# Patient Record
Sex: FEMALE | Race: WHITE | NOT HISPANIC OR LATINO | Employment: FULL TIME | RURAL
[De-identification: names, ages, dates, MRNs, and addresses within clinical notes are randomized per-mention and may not be internally consistent; named-entity substitution may affect disease eponyms.]

---

## 2020-08-03 ENCOUNTER — HISTORICAL (OUTPATIENT)
Dept: ADMINISTRATIVE | Facility: HOSPITAL | Age: 45
End: 2020-08-03

## 2020-08-03 LAB
HBV SURFACE AB SER-ACNC: NORMAL M[IU]/ML
HBV SURFACE AG SERPL QL IA: NORMAL

## 2020-08-05 LAB
VARICELLA ZOSTER, BLOOD: POSITIVE
VZV IGG INDEX: 4.36 (ref 0–0.9)

## 2021-06-01 ENCOUNTER — CLINICAL SUPPORT (OUTPATIENT)
Dept: PRIMARY CARE CLINIC | Facility: CLINIC | Age: 46
End: 2021-06-01

## 2021-06-01 DIAGNOSIS — Z56.9 EMPLOYMENT PROBLEM: ICD-10-CM

## 2021-06-01 DIAGNOSIS — Z00.00 PHYSICAL EXAM: ICD-10-CM

## 2021-06-01 DIAGNOSIS — Z11.1 SCREENING-PULMONARY TB: Primary | ICD-10-CM

## 2021-06-01 LAB
HBV SURFACE AB SER-ACNC: NORMAL M[IU]/ML
RUBV IGG SER-ACNC: 122.4 IU/ML

## 2021-06-01 PROCEDURE — 86765 RUBEOLA ANTIBODY IGG: ICD-10-PCS | Mod: ,,, | Performed by: CLINICAL MEDICAL LABORATORY

## 2021-06-01 PROCEDURE — 86706 HEPATITIS B SURFACE ANTIBODY: ICD-10-PCS | Mod: ,,, | Performed by: CLINICAL MEDICAL LABORATORY

## 2021-06-01 PROCEDURE — 36415 COLL VENOUS BLD VENIPUNCTURE: CPT | Mod: ,,, | Performed by: NURSE PRACTITIONER

## 2021-06-01 PROCEDURE — 86803 HEPATITIS C ANTIBODY: ICD-10-PCS | Mod: ,,, | Performed by: CLINICAL MEDICAL LABORATORY

## 2021-06-01 PROCEDURE — 86762 RUBELLA ANTIBODY, IGG: ICD-10-PCS | Mod: ,,, | Performed by: CLINICAL MEDICAL LABORATORY

## 2021-06-01 PROCEDURE — 86735 MUMPS ANTIBODY: CPT | Mod: ,,, | Performed by: CLINICAL MEDICAL LABORATORY

## 2021-06-01 PROCEDURE — 86735 MUMPS, IGG SCREEN: ICD-10-PCS | Mod: ,,, | Performed by: CLINICAL MEDICAL LABORATORY

## 2021-06-01 PROCEDURE — 86803 HEPATITIS C AB TEST: CPT | Mod: ,,, | Performed by: CLINICAL MEDICAL LABORATORY

## 2021-06-01 PROCEDURE — 86706 HEP B SURFACE ANTIBODY: CPT | Mod: ,,, | Performed by: CLINICAL MEDICAL LABORATORY

## 2021-06-01 PROCEDURE — 86765 RUBEOLA ANTIBODY: CPT | Mod: ,,, | Performed by: CLINICAL MEDICAL LABORATORY

## 2021-06-01 PROCEDURE — 86762 RUBELLA ANTIBODY: CPT | Mod: ,,, | Performed by: CLINICAL MEDICAL LABORATORY

## 2021-06-01 PROCEDURE — 36415 PR COLLECTION VENOUS BLOOD,VENIPUNCTURE: ICD-10-PCS | Mod: ,,, | Performed by: NURSE PRACTITIONER

## 2021-06-01 PROCEDURE — 86787 VARICELLA-ZOSTER ANTIBODY: CPT | Mod: ,,, | Performed by: CLINICAL MEDICAL LABORATORY

## 2021-06-01 PROCEDURE — 86787 VARICELLA ZOSTER ANTIBODY, IGG: ICD-10-PCS | Mod: ,,, | Performed by: CLINICAL MEDICAL LABORATORY

## 2021-06-01 SDOH — SOCIAL DETERMINANTS OF HEALTH (SDOH): UNSPECIFIED PROBLEMS RELATED TO EMPLOYMENT: Z56.9

## 2021-06-02 LAB
HCV AB SER QL: NORMAL
MEASLES IGG INDEX: 3.7
MEV IGG SER QL: POSITIVE
MUMPS AB IGG INDEX: 1.2
MUV IGG SER QL IA: POSITIVE
VARICELLA ZOSTER, BLOOD: POSITIVE
VZV IGG INDEX: >4.2 (ref 0–0.9)

## 2021-06-03 LAB
TB INDURATION - 48 HR READ: 0 MM
TB INDURATION - 72 HR READ: NORMAL
TB SKIN TEST - 48 HR READ: NEGATIVE
TB SKIN TEST - 72 HR READ: NORMAL

## 2022-02-02 ENCOUNTER — LAB VISIT (OUTPATIENT)
Dept: PRIMARY CARE CLINIC | Facility: CLINIC | Age: 47
End: 2022-02-02

## 2022-02-02 DIAGNOSIS — Z20.822 COVID-19 RULED OUT: Primary | ICD-10-CM

## 2022-02-02 LAB
FLUAV AG UPPER RESP QL IA.RAPID: NEGATIVE
FLUBV AG UPPER RESP QL IA.RAPID: NEGATIVE
SARS-COV+SARS-COV-2 AG RESP QL IA.RAPID: POSITIVE

## 2022-02-02 PROCEDURE — 87428 SARSCOV & INF VIR A&B AG IA: CPT | Performed by: NURSE PRACTITIONER

## 2022-02-02 NOTE — PROGRESS NOTES
Subjective:       Patient ID: Genesis Medeiros is a 46 y.o. female.    Chief Complaint: No chief complaint on file.    HPI  Review of Systems      Objective:      Physical Exam    Assessment:       Problem List Items Addressed This Visit    None     Visit Diagnoses     COVID-19 ruled out    -  Primary    Relevant Orders    SARS-CoV2 (COVID) with FLU Antigen          Plan:       Covid and flu only

## 2022-06-24 ENCOUNTER — OFFICE VISIT (OUTPATIENT)
Dept: PRIMARY CARE CLINIC | Facility: CLINIC | Age: 47
End: 2022-06-24

## 2022-06-24 DIAGNOSIS — Z20.822 COVID-19 RULED OUT: Primary | ICD-10-CM

## 2022-06-24 PROCEDURE — 87428 SARSCOV & INF VIR A&B AG IA: CPT | Performed by: NURSE PRACTITIONER

## 2022-10-21 ENCOUNTER — CLINICAL SUPPORT (OUTPATIENT)
Dept: PRIMARY CARE CLINIC | Facility: CLINIC | Age: 47
End: 2022-10-21

## 2022-10-21 DIAGNOSIS — Z02.89 ENCOUNTER FOR PHYSICAL EXAMINATION RELATED TO EMPLOYMENT: Primary | ICD-10-CM

## 2022-10-21 LAB — RUBV IGG SER-ACNC: 134.7 IU/ML

## 2022-10-21 PROCEDURE — 36415 PR COLLECTION VENOUS BLOOD,VENIPUNCTURE: ICD-10-PCS | Mod: ,,, | Performed by: NURSE PRACTITIONER

## 2022-10-21 PROCEDURE — 99499 PR PHYSICAL - BASIC NON DOT/CDL: ICD-10-PCS | Mod: ,,, | Performed by: NURSE PRACTITIONER

## 2022-10-21 PROCEDURE — 90686 FLU VACCINE (QUAD) GREATER THAN OR EQUAL TO 3YO PRESERVATIVE FREE IM: ICD-10-PCS | Mod: ,,, | Performed by: NURSE PRACTITIONER

## 2022-10-21 PROCEDURE — 99000 PR URINE DRUG SCREEN COLLECTION: ICD-10-PCS | Mod: ,,, | Performed by: NURSE PRACTITIONER

## 2022-10-21 PROCEDURE — 90471 IMMUNIZATION ADMIN: CPT | Mod: ,,, | Performed by: NURSE PRACTITIONER

## 2022-10-21 PROCEDURE — 90471 FLU VACCINE (QUAD) GREATER THAN OR EQUAL TO 3YO PRESERVATIVE FREE IM: ICD-10-PCS | Mod: ,,, | Performed by: NURSE PRACTITIONER

## 2022-10-21 PROCEDURE — 90715 TDAP VACCINE GREATER THAN OR EQUAL TO 7YO IM: ICD-10-PCS | Mod: ,,, | Performed by: NURSE PRACTITIONER

## 2022-10-21 PROCEDURE — 36415 COLL VENOUS BLD VENIPUNCTURE: CPT | Mod: ,,, | Performed by: NURSE PRACTITIONER

## 2022-10-21 PROCEDURE — 99499 UNLISTED E&M SERVICE: CPT | Mod: ,,, | Performed by: NURSE PRACTITIONER

## 2022-10-21 PROCEDURE — 90472 PR IMMUNIZ,ADMIN,EACH ADDL: ICD-10-PCS | Mod: ,,, | Performed by: NURSE PRACTITIONER

## 2022-10-21 PROCEDURE — 90686 IIV4 VACC NO PRSV 0.5 ML IM: CPT | Mod: ,,, | Performed by: NURSE PRACTITIONER

## 2022-10-21 PROCEDURE — 90472 IMMUNIZATION ADMIN EACH ADD: CPT | Mod: ,,, | Performed by: NURSE PRACTITIONER

## 2022-10-21 PROCEDURE — 99000 SPECIMEN HANDLING OFFICE-LAB: CPT | Mod: ,,, | Performed by: NURSE PRACTITIONER

## 2022-10-21 PROCEDURE — 90715 TDAP VACCINE 7 YRS/> IM: CPT | Mod: ,,, | Performed by: NURSE PRACTITIONER

## 2022-10-22 LAB
HBV SURFACE AB SER QL IA: NEGATIVE
HBV SURFACE AB SERPL IA-ACNC: <5 MIU/ML

## 2022-10-25 LAB
MITOGEN MINUS NIL RESULT, TB: >10
NIL RESULT, TB: 0
QUANTIFERON-TB GOLD PLUS RESULT: NEGATIVE
TB1 AG MINUS NIL RESULT: 0
TB2 AG MINUS NIL RESULT: 0

## 2022-10-26 LAB
MEASLES IGG INDEX: 4.3
MEV IGG SER QL: POSITIVE
MUMPS AB IGG INDEX: 1.85
MUV IGG SER QL IA: POSITIVE
VARICELLA ZOSTER, BLOOD: POSITIVE
VZV IGG INDEX: >3.5 (ref 0–0.9)

## 2024-03-28 ENCOUNTER — OFFICE VISIT (OUTPATIENT)
Dept: FAMILY MEDICINE | Facility: CLINIC | Age: 49
End: 2024-03-28
Payer: COMMERCIAL

## 2024-03-28 VITALS
TEMPERATURE: 98 F | OXYGEN SATURATION: 99 % | DIASTOLIC BLOOD PRESSURE: 88 MMHG | SYSTOLIC BLOOD PRESSURE: 140 MMHG | HEART RATE: 93 BPM | WEIGHT: 159.19 LBS | RESPIRATION RATE: 19 BRPM

## 2024-03-28 DIAGNOSIS — N39.0 URINARY TRACT INFECTION WITH HEMATURIA, SITE UNSPECIFIED: Primary | ICD-10-CM

## 2024-03-28 DIAGNOSIS — R31.9 URINARY TRACT INFECTION WITH HEMATURIA, SITE UNSPECIFIED: Primary | ICD-10-CM

## 2024-03-28 DIAGNOSIS — R30.0 DYSURIA: ICD-10-CM

## 2024-03-28 LAB
BILIRUB SERPL-MCNC: ABNORMAL MG/DL
BLOOD URINE, POC: ABNORMAL
COLOR, POC UA: YELLOW
GLUCOSE UR QL STRIP: ABNORMAL
KETONES UR QL STRIP: ABNORMAL
LEUKOCYTE ESTERASE URINE, POC: ABNORMAL
NITRITE, POC UA: ABNORMAL
PH, POC UA: 6
PROTEIN, POC: ABNORMAL
SPECIFIC GRAVITY, POC UA: 1.01
UROBILINOGEN, POC UA: 0.2

## 2024-03-28 PROCEDURE — 81003 URINALYSIS AUTO W/O SCOPE: CPT | Mod: QW,,, | Performed by: NURSE PRACTITIONER

## 2024-03-28 PROCEDURE — 99203 OFFICE O/P NEW LOW 30 MIN: CPT | Mod: ,,, | Performed by: NURSE PRACTITIONER

## 2024-03-28 RX ORDER — NITROFURANTOIN 25; 75 MG/1; MG/1
100 CAPSULE ORAL 2 TIMES DAILY
Qty: 10 CAPSULE | Refills: 0 | Status: SHIPPED | OUTPATIENT
Start: 2024-03-28 | End: 2024-04-02

## 2024-04-10 NOTE — ASSESSMENT & PLAN NOTE
UA positive for uti.   Macrobid prescribed to take as directed. Instructed to take all abx until gone even if start to feel better.    Instructed to increase water intake to help flush out bacteria.   Urine cx pending. Will inform of results when available.   Instructed to RTC for any new/worsening/persisting ssx.

## 2024-04-10 NOTE — PROGRESS NOTES
MIRTA Rhodes   [facility]  [address]  [place] [address]     PATIENT NAME: Genesis Medeiros  : 1975  DATE: 3/28/24  MRN: [id number]      Billing Provider: MIRTA Rhodes  Level of Service:   Patient PCP Information       Provider PCP Type    MIRTA Rhodes General            Reason for Visit / Chief Complaint: Urinary Tract Infection (Since this morning with intense burning)   Health Maintenance Due   Topic Date Due    Cervical Cancer Screening  Never done    Lipid Panel  Never done    COVID-19 Vaccine (1) Never done    HIV Screening  Never done    Mammogram  Never done    Colorectal Cancer Screening  Never done    Influenza Vaccine (1) 2023          Update PCP  Update Chief Complaint         History of Present Illness / Problem Focused Workflow     Genesis Medeirso presents to the clinic with burning with urination. Pt is known to provider at previous clinic. Pt denies having frequent utis. She denies fever, low abd/back pain, n/v. She has started increasing water intake since this AM. She denies any other needs at this time. NAD noted.      Wt Readings from Last 3 Encounters:   24 1601 72.2 kg (159 lb 3.2 oz)        BP Readings from Last 3 Encounters:   24 (!) 140/88        Review of Systems     Review of Systems   Constitutional: Negative.    Respiratory: Negative.     Cardiovascular: Negative.    Gastrointestinal: Negative.    Endocrine: Negative.    Genitourinary:  Positive for dysuria.   Musculoskeletal: Negative.    Integumentary:  Negative.   Allergic/Immunologic: Negative.    Neurological: Negative.    Hematological: Negative.    Psychiatric/Behavioral: Negative.          Medical / Social / Family History   History reviewed. No pertinent past medical history.    History reviewed. No pertinent surgical history.    Social History  Ms.  reports that she has never smoked. She has never been exposed to tobacco smoke. She has never used smokeless  tobacco.    Family History  Ms.'s family history is not on file.    Medications and Allergies     Medications  No outpatient medications have been marked as taking for the 3/28/24 encounter (Office Visit) with Martha Gonzalez FNP.       Allergies  Review of patient's allergies indicates:  No Known Allergies    Physical Examination     Vitals:    03/28/24 1601   BP: (!) 140/88   BP Location: Right arm   Patient Position: Sitting   BP Method: Medium (Automatic)   Pulse: 93   Resp: 19   Temp: 98.1 °F (36.7 °C)   TempSrc: Oral   SpO2: 99%   Weight: 72.2 kg (159 lb 3.2 oz)      Physical Exam  Constitutional:       Appearance: Normal appearance.   HENT:      Head: Normocephalic.   Eyes:      Extraocular Movements: Extraocular movements intact.   Cardiovascular:      Rate and Rhythm: Normal rate and regular rhythm.      Pulses: Normal pulses.      Heart sounds: Normal heart sounds.   Pulmonary:      Effort: Pulmonary effort is normal.      Breath sounds: Normal breath sounds.   Skin:     General: Skin is warm and dry.      Capillary Refill: Capillary refill takes less than 2 seconds.   Neurological:      General: No focal deficit present.      Mental Status: She is alert and oriented to person, place, and time.   Psychiatric:         Mood and Affect: Mood normal.         Behavior: Behavior normal.          Assessment and Plan (including Health Maintenance)      Problem List  Smart Sets  Document Outside HM   :    Plan:     There are no Patient Instructions on file for this visit.       Health Maintenance Due   Topic Date Due    Cervical Cancer Screening  Never done    Lipid Panel  Never done    COVID-19 Vaccine (1) Never done    HIV Screening  Never done    Mammogram  Never done    Colorectal Cancer Screening  Never done    Influenza Vaccine (1) 09/01/2023       Problem List Items Addressed This Visit       Dysuria    Current Assessment & Plan     See above plan.   May take AZO otc.   Instructed to RTC for any  new/worsening/persisting ssx.           Relevant Orders    POCT URINALYSIS W/O SCOPE (Completed)    Urinary tract infection with hematuria - Primary    Current Assessment & Plan     UA positive for uti.   Macrobid prescribed to take as directed. Instructed to take all abx until gone even if start to feel better.    Instructed to increase water intake to help flush out bacteria.   Urine cx pending. Will inform of results when available.   Instructed to RTC for any new/worsening/persisting ssx.           Relevant Orders    Urine culture     Urinary tract infection with hematuria, site unspecified  -     Urine culture  -     nitrofurantoin, macrocrystal-monohydrate, (MACROBID) 100 MG capsule; Take 1 capsule (100 mg total) by mouth 2 (two) times daily. for 5 days  Dispense: 10 capsule; Refill: 0    Dysuria  -     POCT URINALYSIS W/O SCOPE       Health Maintenance Topics with due status: Not Due       Topic Last Completion Date    TETANUS VACCINE 10/21/2022         No future appointments.     Follow up if symptoms worsen or fail to improve.    Health Maintenance Due   Topic Date Due    Cervical Cancer Screening  Never done    Lipid Panel  Never done    COVID-19 Vaccine (1) Never done    HIV Screening  Never done    Mammogram  Never done    Colorectal Cancer Screening  Never done    Influenza Vaccine (1) 09/01/2023        Signature:  MIRTA Rhodes    Date of encounter: 3/28/24

## 2024-07-01 PROBLEM — N39.0 URINARY TRACT INFECTION WITH HEMATURIA: Status: RESOLVED | Noted: 2024-03-28 | Resolved: 2024-07-01

## 2024-07-01 PROBLEM — R31.9 URINARY TRACT INFECTION WITH HEMATURIA: Status: RESOLVED | Noted: 2024-03-28 | Resolved: 2024-07-01

## 2025-04-07 ENCOUNTER — CLINICAL SUPPORT (OUTPATIENT)
Dept: PRIMARY CARE CLINIC | Facility: CLINIC | Age: 50
End: 2025-04-07
Payer: COMMERCIAL

## 2025-04-07 DIAGNOSIS — Z46.9 DEVICE FITTING OR ADJUSTMENT: ICD-10-CM

## 2025-04-07 DIAGNOSIS — Z02.89 EXAMINATION, PHYSICAL, EMPLOYEE: Primary | ICD-10-CM

## 2025-04-07 LAB — HBV SURFACE AB SER-ACNC: NORMAL M[IU]/ML

## 2025-04-07 PROCEDURE — 36415 COLL VENOUS BLD VENIPUNCTURE: CPT | Mod: ,,, | Performed by: NURSE PRACTITIONER

## 2025-04-07 PROCEDURE — 86787 VARICELLA-ZOSTER ANTIBODY: CPT | Performed by: NURSE PRACTITIONER

## 2025-04-07 PROCEDURE — 99080 SPECIAL REPORTS OR FORMS: CPT | Mod: ,,, | Performed by: NURSE PRACTITIONER

## 2025-04-07 PROCEDURE — 86706 HEP B SURFACE ANTIBODY: CPT | Performed by: NURSE PRACTITIONER

## 2025-04-07 PROCEDURE — 99499 UNLISTED E&M SERVICE: CPT | Mod: ,,, | Performed by: NURSE PRACTITIONER

## 2025-04-09 LAB
VARICELLA ZOSTER, BLOOD: POSITIVE
VZV IGG INDEX: >3 (ref 0–0.9)